# Patient Record
Sex: FEMALE | Race: WHITE | URBAN - METROPOLITAN AREA
[De-identification: names, ages, dates, MRNs, and addresses within clinical notes are randomized per-mention and may not be internally consistent; named-entity substitution may affect disease eponyms.]

---

## 2018-06-03 ENCOUNTER — TRANSFERRED RECORDS (OUTPATIENT)
Dept: HEALTH INFORMATION MANAGEMENT | Facility: CLINIC | Age: 53
End: 2018-06-03

## 2018-06-03 ENCOUNTER — HOSPITAL ENCOUNTER (INPATIENT)
Facility: HOSPITAL | Age: 53
LOS: 2 days | Discharge: HOME | DRG: 885 | End: 2018-06-05
Attending: PSYCHIATRY & NEUROLOGY | Admitting: PSYCHIATRY & NEUROLOGY
Payer: MEDICARE

## 2018-06-03 PROBLEM — F29 PSYCHOSIS (H): Status: ACTIVE | Noted: 2018-06-03

## 2018-06-03 PROCEDURE — 25000132 ZZH RX MED GY IP 250 OP 250 PS 637: Mod: GY | Performed by: NURSE PRACTITIONER

## 2018-06-03 PROCEDURE — A9270 NON-COVERED ITEM OR SERVICE: HCPCS | Mod: GY | Performed by: NURSE PRACTITIONER

## 2018-06-03 PROCEDURE — 12400000 ZZH R&B MH

## 2018-06-03 RX ORDER — POTASSIUM CHLORIDE 1500 MG/1
20 TABLET, EXTENDED RELEASE ORAL DAILY
COMMUNITY

## 2018-06-03 RX ORDER — LORAZEPAM 1 MG/1
1 TABLET ORAL PRN
COMMUNITY

## 2018-06-03 RX ORDER — HYDROXYZINE HYDROCHLORIDE 25 MG/1
25 TABLET, FILM COATED ORAL 3 TIMES DAILY PRN
COMMUNITY

## 2018-06-03 RX ORDER — TOPIRAMATE 50 MG/1
100 TABLET, FILM COATED ORAL 2 TIMES DAILY
COMMUNITY
Start: 2013-07-22

## 2018-06-03 RX ORDER — TRAZODONE HYDROCHLORIDE 50 MG/1
50 TABLET, FILM COATED ORAL
Status: DISCONTINUED | OUTPATIENT
Start: 2018-06-03 | End: 2018-06-05 | Stop reason: HOSPADM

## 2018-06-03 RX ORDER — LOPERAMIDE HCL 2 MG
2 CAPSULE ORAL
Status: DISCONTINUED | OUTPATIENT
Start: 2018-06-03 | End: 2018-06-05 | Stop reason: HOSPADM

## 2018-06-03 RX ORDER — LEVOTHYROXINE SODIUM 175 UG/1
175 TABLET ORAL DAILY
COMMUNITY

## 2018-06-03 RX ORDER — ALUMINA, MAGNESIA, AND SIMETHICONE 2400; 2400; 240 MG/30ML; MG/30ML; MG/30ML
30 SUSPENSION ORAL EVERY 4 HOURS PRN
Status: DISCONTINUED | OUTPATIENT
Start: 2018-06-03 | End: 2018-06-05 | Stop reason: HOSPADM

## 2018-06-03 RX ORDER — OLANZAPINE 10 MG/2ML
10 INJECTION, POWDER, FOR SOLUTION INTRAMUSCULAR 3 TIMES DAILY PRN
Status: DISCONTINUED | OUTPATIENT
Start: 2018-06-03 | End: 2018-06-05 | Stop reason: HOSPADM

## 2018-06-03 RX ORDER — OLANZAPINE 10 MG/1
10 TABLET ORAL 3 TIMES DAILY PRN
Status: DISCONTINUED | OUTPATIENT
Start: 2018-06-03 | End: 2018-06-05 | Stop reason: HOSPADM

## 2018-06-03 RX ORDER — BISACODYL 10 MG
10 SUPPOSITORY, RECTAL RECTAL DAILY PRN
Status: DISCONTINUED | OUTPATIENT
Start: 2018-06-03 | End: 2018-06-05 | Stop reason: HOSPADM

## 2018-06-03 RX ORDER — MULTIVIT-MIN/IRON/FOLIC ACID/K 18-600-40
2000 CAPSULE ORAL DAILY
COMMUNITY

## 2018-06-03 RX ORDER — MIRTAZAPINE 15 MG/1
15 TABLET, FILM COATED ORAL AT BEDTIME
COMMUNITY

## 2018-06-03 RX ORDER — HYDROXYZINE HYDROCHLORIDE 25 MG/1
25-50 TABLET, FILM COATED ORAL EVERY 4 HOURS PRN
Status: DISCONTINUED | OUTPATIENT
Start: 2018-06-03 | End: 2018-06-05 | Stop reason: HOSPADM

## 2018-06-03 RX ORDER — OMEPRAZOLE AND SODIUM BICARBONATE 40; 1100 MG/1; MG/1
1 CAPSULE ORAL DAILY
COMMUNITY
Start: 2012-08-24

## 2018-06-03 RX ORDER — ACETAMINOPHEN 325 MG/1
650 TABLET ORAL EVERY 4 HOURS PRN
Status: DISCONTINUED | OUTPATIENT
Start: 2018-06-03 | End: 2018-06-05 | Stop reason: HOSPADM

## 2018-06-03 RX ORDER — PINDOLOL 10 MG/1
10 TABLET ORAL 2 TIMES DAILY
COMMUNITY
Start: 2009-09-02

## 2018-06-03 RX ADMIN — OLANZAPINE 10 MG: 10 TABLET, FILM COATED ORAL at 20:53

## 2018-06-03 RX ADMIN — LOPERAMIDE HYDROCHLORIDE 2 MG: 2 CAPSULE ORAL at 18:38

## 2018-06-03 ASSESSMENT — ACTIVITIES OF DAILY LIVING (ADL)
SWALLOWING: 0-->SWALLOWS FOODS/LIQUIDS WITHOUT DIFFICULTY
TRANSFERRING: 0-->INDEPENDENT
TOILETING: 0-->INDEPENDENT
AMBULATION: 0-->INDEPENDENT
RETIRED_EATING: 0-->INDEPENDENT
DRESS: 0-->INDEPENDENT
FALL_HISTORY_WITHIN_LAST_SIX_MONTHS: NO
BATHING: 0-->INDEPENDENT
RETIRED_COMMUNICATION: 0-->UNDERSTANDS/COMMUNICATES WITHOUT DIFFICULTY
COGNITION: 0 - NO COGNITION ISSUES REPORTED

## 2018-06-03 NOTE — IP AVS SNAPSHOT
MRN:[id number]                      After Visit Summary   6/3/2018    Patience Lemons    MRN: [id number]           Thank you!     Thank you for choosing [facility] for your care. Our goal is always to provide you with excellent care. Hearing back from our patients is one way we can continue to improve our services. Please take a few minutes to complete the written survey that you may receive in the mail after you visit with us. Thank you!        Patient Information     Date Of Birth          1965        Designated Caregiver       Most Recent Value    Caregiver    Will someone help with your care after discharge? no      About your hospital stay     You were admitted on:  Karrie 3, 2018 You last received care in the:  [facility]    You were discharged on:  June 5, 2018       Who to Call     For medical emergencies, please call 911.  For non-urgent questions about your medical care, please call your primary care provider or clinic, None          Attending Provider     Provider Specialty    Hugo Ohara MD Psychiatry    Rashida Hanson NP Licensed [facility]       Primary Care Provider Fax #    Physician No Ref-Primary [phone]      Further instructions from your care team       Behavioral Discharge Planning and Instructions    Summary: Patient was admitted with SI and plan to OD on medications     Main Diagnosis: schizophrenia-disorganized type, Mild cognitive impairment    Major Treatments, Procedures and Findings: Stabilize with medications, connect with community programs.    Symptoms to Report: feeling more aggressive, increased confusion, losing more sleep, mood getting worse or thoughts of suicide    Lifestyle Adjustment: Take all medications as prescribed, meet with doctor/ medication provider, out patient therapist, , and [facility] worker as scheduled. Abstain from alcohol or any unprescribed drugs.    Psychiatry Follow-up:     [facility] Anuel Hernandez  Manager- Jacqueline- As arranged   [facility]- Arcenio- As arranged   Medication Management- Arianne Talib- June 13th @ 1:30pm   Injection- June 6th @ 1:30pm  [address] [place] [address]  Phone- [phone]   Fax- [phone]    [facility]  Therapy- Elsa Felix- Monday June 11th @ 3pm   [address] [address]    Phone: [phone]  Fax: [phone]    [facility]   - Luisana- As arranged   [address] [place] [address]  Phone [phone]   Fax- [phone]      [facility]  PCP- Jyotsna Pedroza- As needed   [address] [place] [address]   Phone: [phone]      Resources:   Crisis Intervention: 879.829.8113 or 822-987-2575 (TTY: 497.612.1849).  Call anytime for help.  National [place] on Mental Illness (www.[place].harika.org): 301.232.2599 or 758-067-6591.  Suicide Awareness Voices of Education (SAVE) (www.save.org): 872-385-EHQM (3780)  National Suicide Prevention Line (www.mentalhealthmn.org): 751-717-SQKL (3247)  Mental Health Consumer/Survivor Network of [place] (www.mhcsn.net): 114.454.4688 or 961-366-9463  Mental Health Association of [place] (www.mentalhealth.org): 299.238.9486 or 173-491-7276    General Medication Instructions:   See your medication sheet(s) for instructions.   Take all medicines as directed.  Make no changes unless your doctor suggests them.   Go to all your doctor visits.  Be sure to have all your required lab tests. This way, your medicines can be refilled on time.  Do not use any drugs not prescribed by your doctor.  Avoid alcohol.      [place] Area:  [facility], [facility] stabilization [facility]- [phone]  [facility] Crisis Line: 3-521-071-3335  Advocates For Family Peace: 358-3429  Sexual Assault Program [facility]: 617.533.4813 or 1-280-579-4210  [facility] [facility]attered Women[facility]: 7-290-972-0797 Ext: 279       Calls answered Mon-Fri-8:00 am--4:30 pm    [place]:  [facility]ates for Family Peace:  "1-508.677.4773  [facility] first call for help: 1-579.314.9229  [facility]:  (279) 491-2035      [place] Area:  Warm Line: 1-312.452.1225       Calls answered Tuesday--Saturday 4:00 pm--10:00 pm  [facility] Crisis Line - 594.235.2718  [facility] 456-550-7342    [place] Statewide:  [place] Crisis and Referral Services: 1-692.616.9441  National Suicide Prevention Lifeline: 0-420-941-TALK (2524)   - ede9kdxa- Text  Life  to [address]  First Call for Help:   [facility] Helpline- 1-185-YKLM-HELP    Pending Results     No orders found from 2018 to 2018.            Statement of Approval     Ordered          18 0814  I have reviewed and agree with all the recommendations and orders detailed in this document.  EFFECTIVE NOW     Approved and electronically signed by:  Rashida Hanson NP             Admission Information     Date & Time Provider Department Dept. Phone    6/3/2018 Rashida Hanson NP [facility] [phone]      Your Vitals Were     Blood Pressure Pulse Temperature Respirations Height Weight    145/76 70 98  F (36.7  C) (Tympanic) 20 1.676 m (5' 6\") 126.5 kg (278 lb 12.8 oz)    Pulse Oximetry BMI (Body Mass Index)                97% 45 kg/m2          [email or web address] Information     [employer] lets you send messages to your doctor, view your test results, renew your prescriptions, schedule appointments and more. To sign up, go to [email or web address] . Click on \"Log in\" on the left side of the screen, which will take you to the Welcome page. Then click on \"Sign up Now\" on the right side of the page.     You will be asked to enter the access code listed below, as well as some personal information. Please follow the directions to create your username and password.     Your access code is: [email or web address]  Expires: 2018  2:15 PM     Your access code will  in 90 days. If you need help or a new code, please call your [place] clinic or [phone].      "   Care EveryWhere ID     This is your Care EveryWhere ID. This could be used by other organizations to access your [place] medical records  [id number]        Equal Access to Services     MAMI RICHARDSON : Tete Cross, neal garcia, sandy mathurmajae bennett, star simsbrandononeal garvin. So [facility] [phone].    ATENCIÓN: Si habla español, tiene a acevedo disposición servicios gratuitos de asistencia lingüística. Llame al [phone].    We comply with applicable federal civil rights laws and [place] laws. We do not discriminate on the basis of race, color, national origin, age, disability, sex, sexual orientation, or gender identity.               Review of your medicines      CONTINUE these medicines which have NOT CHANGED        Dose / Directions    ARIPiprazole  MG extended release inj syringe   Commonly known as:  ABILIFY MAINTENA        Dose:  400 mg   Inject 400 mg into the muscle every 30 days   Refills:  0       hydrOXYzine 25 MG tablet   Commonly known as:  ATARAX        Dose:  25 mg   Take 25 mg by mouth 3 times daily as needed   Refills:  0       LORazepam 1 MG tablet   Commonly known as:  ATIVAN        Dose:  1 mg   Take 1 mg by mouth as needed   Refills:  0       mirtazapine 15 MG tablet   Commonly known as:  REMERON        Dose:  15 mg   Take 15 mg by mouth At Bedtime   Refills:  0       OSCO LOPERAMIDE HCL OR        Dose:  2 mg   Take 2 mg by mouth 3 times daily (before meals)   Refills:  0       pindolol 10 MG tablet   Commonly known as:  VISKEN        Dose:  10 mg   Take 10 mg by mouth 2 times daily   Refills:  0       Potassium Chloride ER 20 MEQ Tbcr        Dose:  20 mEq   Take 20 mEq by mouth daily   Refills:  0       SYNTHROID 175 MCG tablet   Generic drug:  levothyroxine        Dose:  175 mcg   Take 175 mcg by mouth daily   Refills:  0       topiramate 50 MG tablet   Commonly known as:  TOPAMAX        Dose:  100 mg   Take 100 mg by mouth 2 times daily    Refills:  0       vitamin D 2000 units Caps        Dose:  2000 Units   Take 2,000 Units by mouth daily   Refills:  0       ZEGERID  MG per capsule   Generic drug:  omeprazole-sodium bicarbonate        Dose:  1 capsule   Take 1 capsule by mouth daily   Refills:  0                Protect others around you: Learn how to safely use, store and throw away your medicines at www.disposemymeds.org.             Medication List: This is a list of all your medications and when to take them. Check marks below indicate your daily home schedule. Keep this list as a reference.      Medications           Morning Afternoon Evening Bedtime As Needed    ARIPiprazole  MG extended release inj syringe   Commonly known as:  ABILIFY MAINTENA   Inject 400 mg into the muscle every 30 days                                hydrOXYzine 25 MG tablet   Commonly known as:  ATARAX   Take 25 mg by mouth 3 times daily as needed                                LORazepam 1 MG tablet   Commonly known as:  ATIVAN   Take 1 mg by mouth as needed                                mirtazapine 15 MG tablet   Commonly known as:  REMERON   Take 15 mg by mouth At Bedtime   Last time this was given:  15 mg on 6/4/2018  8:26 PM                                OSCO LOPERAMIDE HCL OR   Take 2 mg by mouth 3 times daily (before meals)                                pindolol 10 MG tablet   Commonly known as:  VISKEN   Take 10 mg by mouth 2 times daily                                Potassium Chloride ER 20 MEQ Tbcr   Take 20 mEq by mouth daily                                SYNTHROID 175 MCG tablet   Take 175 mcg by mouth daily   Last time this was given:  175 mcg on 6/5/2018  6:06 AM   Generic drug:  levothyroxine                                topiramate 50 MG tablet   Commonly known as:  TOPAMAX   Take 100 mg by mouth 2 times daily   Last time this was given:  100 mg on 6/5/2018  8:07 AM                                vitamin D 2000 units Caps   Take 2,000  Units by mouth daily                                ZEGERID  MG per capsule   Take 1 capsule by mouth daily   Generic drug:  omeprazole-sodium bicarbonate

## 2018-06-03 NOTE — IP AVS SNAPSHOT
[facility]    [address] [place] [address]    Phone:  [phone]    Fax:  [phone]                                       After Visit Summary   6/3/2018    Patience Lemons    MRN: [id number]           After Visit Summary Signature Page     I have received my discharge instructions, and my questions have been answered. I have discussed any challenges I see with this plan with the nurse or doctor.    ..........................................................................................................................................  Patient/Patient Representative Signature      ..........................................................................................................................................  Patient Representative Print Name and Relationship to Patient    ..................................................               ................................................  Date                                            Time    ..........................................................................................................................................  Reviewed by Signature/Title    ...................................................              ..............................................  Date                                                            Time

## 2018-06-04 PROCEDURE — 99223 1ST HOSP IP/OBS HIGH 75: CPT | Mod: AI | Performed by: NURSE PRACTITIONER

## 2018-06-04 PROCEDURE — A9270 NON-COVERED ITEM OR SERVICE: HCPCS | Mod: GY | Performed by: NURSE PRACTITIONER

## 2018-06-04 PROCEDURE — 12400000 ZZH R&B MH

## 2018-06-04 PROCEDURE — 25000132 ZZH RX MED GY IP 250 OP 250 PS 637: Mod: GY | Performed by: NURSE PRACTITIONER

## 2018-06-04 RX ORDER — MIRTAZAPINE 15 MG/1
15 TABLET, FILM COATED ORAL AT BEDTIME
Status: DISCONTINUED | OUTPATIENT
Start: 2018-06-04 | End: 2018-06-05 | Stop reason: HOSPADM

## 2018-06-04 RX ORDER — OMEPRAZOLE 40 MG/1
40 CAPSULE, DELAYED RELEASE ORAL DAILY
Status: DISCONTINUED | OUTPATIENT
Start: 2018-06-04 | End: 2018-06-04

## 2018-06-04 RX ORDER — PINDOLOL 5 MG/1
10 TABLET ORAL 2 TIMES DAILY
Status: DISCONTINUED | OUTPATIENT
Start: 2018-06-04 | End: 2018-06-05 | Stop reason: HOSPADM

## 2018-06-04 RX ORDER — POTASSIUM CHLORIDE 1500 MG/1
20 TABLET, EXTENDED RELEASE ORAL DAILY
Status: DISCONTINUED | OUTPATIENT
Start: 2018-06-04 | End: 2018-06-04

## 2018-06-04 RX ORDER — POTASSIUM CHLORIDE 1500 MG/1
20 TABLET, EXTENDED RELEASE ORAL DAILY
Status: DISCONTINUED | OUTPATIENT
Start: 2018-06-04 | End: 2018-06-05 | Stop reason: HOSPADM

## 2018-06-04 RX ORDER — LORAZEPAM 1 MG/1
1 TABLET ORAL DAILY PRN
Status: DISCONTINUED | OUTPATIENT
Start: 2018-06-04 | End: 2018-06-05 | Stop reason: HOSPADM

## 2018-06-04 RX ORDER — TOPIRAMATE 100 MG/1
100 TABLET, FILM COATED ORAL 2 TIMES DAILY
Status: DISCONTINUED | OUTPATIENT
Start: 2018-06-04 | End: 2018-06-05 | Stop reason: HOSPADM

## 2018-06-04 RX ORDER — NYSTATIN 100000 U/G
CREAM TOPICAL 2 TIMES DAILY
Status: DISCONTINUED | OUTPATIENT
Start: 2018-06-04 | End: 2018-06-05 | Stop reason: HOSPADM

## 2018-06-04 RX ADMIN — LOPERAMIDE HYDROCHLORIDE 2 MG: 2 CAPSULE ORAL at 16:16

## 2018-06-04 RX ADMIN — LOPERAMIDE HYDROCHLORIDE 2 MG: 2 CAPSULE ORAL at 11:52

## 2018-06-04 RX ADMIN — POTASSIUM CHLORIDE 20 MEQ: 20 TABLET, EXTENDED RELEASE ORAL at 13:06

## 2018-06-04 RX ADMIN — OLANZAPINE 10 MG: 10 TABLET, FILM COATED ORAL at 19:31

## 2018-06-04 RX ADMIN — TOPIRAMATE 100 MG: 100 TABLET, FILM COATED ORAL at 13:06

## 2018-06-04 RX ADMIN — ACETAMINOPHEN 650 MG: 325 TABLET ORAL at 16:23

## 2018-06-04 RX ADMIN — LEVOTHYROXINE SODIUM 175 MCG: 150 TABLET ORAL at 13:06

## 2018-06-04 RX ADMIN — OMEPRAZOLE 40 MG: 20 CAPSULE, DELAYED RELEASE ORAL at 13:06

## 2018-06-04 RX ADMIN — NYSTATIN: 100000 CREAM TOPICAL at 21:16

## 2018-06-04 RX ADMIN — ACETAMINOPHEN 650 MG: 325 TABLET ORAL at 08:03

## 2018-06-04 RX ADMIN — MIRTAZAPINE 15 MG: 15 TABLET, FILM COATED ORAL at 20:26

## 2018-06-04 RX ADMIN — LOPERAMIDE HYDROCHLORIDE 2 MG: 2 CAPSULE ORAL at 08:03

## 2018-06-04 RX ADMIN — TOPIRAMATE 100 MG: 100 TABLET, FILM COATED ORAL at 20:26

## 2018-06-04 RX ADMIN — VITAMIN D, TAB 1000IU (100/BT) 2000 UNITS: 25 TAB at 13:06

## 2018-06-04 RX ADMIN — NYSTATIN: 100000 CREAM TOPICAL at 13:06

## 2018-06-04 ASSESSMENT — ACTIVITIES OF DAILY LIVING (ADL)
ORAL_HYGIENE: INDEPENDENT
ORAL_HYGIENE: INDEPENDENT
GROOMING: INDEPENDENT
DRESS: SCRUBS (BEHAVIORAL HEALTH)
DRESS: SCRUBS (BEHAVIORAL HEALTH);INDEPENDENT
LAUNDRY: UNABLE TO COMPLETE
GROOMING: INDEPENDENT;SHOWER

## 2018-06-04 NOTE — PLAN OF CARE
"ADMISSION NOTE    Reason for admission: patient reported audio hallucinations distressing to her which lead to SI.  Safety concerns: unknown.  Risk for or history of violence: patient has a history of punching walls, denies ever hitting a person.   Full skin assessment: completed and patient has no pressure ulcers. Patient has areas under breasts and abdominal folds that are reddened and odorous. She denied pain in these areas. No other open areas.     Patient arrived on unit from [facility] accompanied by transport drivers and security on 6/3/2018  4:50 PM.   Status on arrival: pleasant, cooperative, smiling, anxious, disheveled  /80  Pulse 80  Temp 99.2  F (37.3  C) (Tympanic)  Resp 18  Ht 1.676 m (5' 6\")  Wt 126.5 kg (278 lb 12.8 oz)  SpO2 97%  BMI 45 kg/m2  Patient given tour of unit and Welcome to  unit papers given to patient, wanding completed, belongings inventoried, and admission assessment completed.   Patient's legal status on arrival is transport hold. Appropriate legal rights discussed with and copy given to patient. Patient Bill of Rights discussed with and copy given to patient.   Patient denies SI, HI, and thoughts of self harm and contracts for safety while on unit.      Artie Naqvi  6/4/2018  12:36 AM          "

## 2018-06-04 NOTE — PLAN OF CARE
"Problem: Patient Care Overview  Goal: Individualization & Mutuality  Patient will attend >75% of groups.  Patient will perform daily ADLs without prompting.   Patient's skin under abdominal folds and under breasts are reddened. Patient's skin will heal and have no further breakdown while inpatient.   Outcome: No Change  ADMISSION NOTE     Reason for admission: patient reported audio hallucinations distressing to her which lead to SI.  Safety concerns: unknown.  Risk for or history of violence: patient has a history of punching walls, denies ever hitting a person.   Full skin assessment: completed and patient has no pressure ulcers. Patient has areas under breasts and abdominal folds that are reddened and odorous. She denied pain in these areas. No other open areas.      Patient arrived on unit from [facility] accompanied by transport drivers and security on 6/3/2018  4:50 PM.   Status on arrival: pleasant, cooperative, smiling, anxious, disheveled  /80  Pulse 80  Temp 99.2  F (37.3  C) (Tympanic)  Resp 18  Ht 1.676 m (5' 6\")  Wt 126.5 kg (278 lb 12.8 oz)  SpO2 97%  BMI 45 kg/m2  Patient given tour of unit and Welcome to  unit papers given to patient, wanding completed, belongings inventoried, and admission assessment completed.   Patient's legal status on arrival is transport hold. Appropriate legal rights discussed with and copy given to patient. Patient Bill of Rights discussed with and copy given to patient.   Patient denies SI, HI, and thoughts of self harm and contracts for safety while on unit.    Upon admit, patient was smiling and cooperative. She agreed to answer questions and appeared to be authentic and open with answers. She reported she has audio hallucinations she calls \"gremlins\" which tell her things such as they peed on her bed and they took her money. She said the voices cause her stress and then she begins to think of way to kill herself. Patient also reported she had been " "inpatient behavior health a few months ago. She said during the hospital stay she had med changes and then afterwards she had some med changes with Dr. Jewell at [facility] in [place]. She gets her meds at [facility] in [place] which is not open on the weekends and her meds could not be verified. She reported her PCP is Jyotsna Pedroza at [facility] and she sees Alondra Bryant for counseling. Patient's  is Jacqueline at [facility] and she just started [facility] services and met with her once.     Patient reported her depression and anxiety at 8 of 10. She denied SI/HI but said the voices which she calls \"gremlins\" are always there and never go away. She reported they usually are more manageable. Patient said she has trouble falling asleep and staying asleep but doesn't usually have nightmares. She reported that she attempted suicide a few months ago by taking 5 Ativan. After that, her provider told her to not take the Ativan anymore. She took it in the ED this time because she was upset and the staff gave it to her. She said when she found out she was coming here, she told the ED staff she felt \"like punching the wall.\" Patient reported no changes in appetite. Patient sat in the lounge on the open unit for a few hours. She was observed sitting alone and being tearful. Patient asked for something to help her calm. She received 10mg Zyprexa at 20:53.         Problem: Suicide Risk (Adult)  Goal: Physical Safety  Patient will deny suicidal ideation by discharge.   Patient will report relief from voices and that they are manageable by discharge.     Outcome: No Change  No progress made.       "

## 2018-06-04 NOTE — PROGRESS NOTES
06/03/18 1915   Patient Belongings   Did you bring any home meds/supplements to the hospital?  No   Patient Belongings clothing;cell phone/electronics;glasses;earings   Disposition of Belongings Kept with patient;Locker;Sent to security per site process   Belongings Search Yes   Clothing Search Yes   Second Staff Nadya   General Info Comment Pink Underwear, 2 Black Socks, Grey [place] Zipdown, White Capris, Purple Tank Top, Grey Sneakers, Blue Glasses (kept with pt), Silver Colored Earrings (Kept with pt), Silver Colored Ring (kept with pt)    List items sent to safe: Black ZTE Phone  All other belongings put in assigned cubby in belongings room.     I have reviewed my belongings list on admission and verify that it is correct.     Patient signature_______________________________    Second staff witness (if patient unable to sign) ______________________________       I have received all my belongings at discharge.    Patient signature________________________________    Landy   6/3/2018  7:21 PM

## 2018-06-04 NOTE — H&P
"Psychiatric Eval/H&P  Patient Name: aPtience Lemons   YOB: 1965  Age: 53 year old  [id number]    Primary Physician: No Ref-Primary, Physician   Completed By: Rashida Hanson NP     CC: \"I don't feel suicidal anymore\"    HPI   Patience Lemons is a 53 year old never   female who presented via [facility] in [facility] with increased auditory, visual and olfactory hallucinations. Patience reports the \"gremlins\" she hears much of the time are whispers. Patience also does believe these gremlins have tampered with her phone at times and she has played tug-of-war with her purse. She reports she normally does not feel bothered by these but sometimes she just cannot tolerate them. She feels they are worse with medication changes and she has just recently had a medication change. Patience reports she was hospitalized in February but it is not frequent that she is hospitalized. She was committed many years ago to [facility]. She has lived independently for 20 years. She follows with providers at [facility], she has a  and an [employer] worker. Patience is bright and smiling today. She said she usually cries when she feels overwhelmed  With the gremlins and they subside.      SPECIFIC SYMPTOM HISTORY  Sleep:no issues .  Recent appetite change: No.  Recent weight change: No.  Special diet: No.  Other nutritional concerns: none.  Psychotic symptoms (subjective): No hallucinations..nonedenies symptoms of psychosis     [facility]     Past Psychiatric History: Was committed many years ago when [facility]. She follows with providers at [facility]. She denies a history of abuse or trauma. She denies any seizure or head injury.      Social History: Patience was born and raised in the [facility]. She has two older sisters. Her mother is still living. Patience did graduate from highschool . Patience has no legal or  history. She has never been  and has no children. She does have a " boyfriendLoy, of 19 years.   Education, school, occupation, social/peer relations, hobbies/recreational interests,  history, legal history,      Chemical Use History: No substance use or abuse history.     Family Psychiatric History: Unknown family psych history        Medical History and ROS  Prescriptions Prior to Admission   Medication Sig Dispense Refill Last Dose     ARIPiprazole ER (ABILIFY MAINTENA) 400 MG extended release inj syringe Inject 400 mg into the muscle every 30 days   Past Month at Unknown time     Cholecalciferol (VITAMIN D) 2000 units CAPS Take 2,000 Units by mouth daily   6/2/2018 at Unknown time     hydrOXYzine (ATARAX) 25 MG tablet Take 25 mg by mouth 3 times daily as needed   6/2/2018 at Unknown time     levothyroxine (SYNTHROID) 175 MCG tablet Take 175 mcg by mouth daily   6/2/2018 at Unknown time     mirtazapine (REMERON) 15 MG tablet Take 15 mg by mouth At Bedtime   6/2/2018 at Unknown time     omeprazole-sodium bicarbonate (ZEGERID)  MG per capsule Take 1 capsule by mouth daily   6/2/2018 at Unknown time     OSCO LOPERAMIDE HCL OR Take 2 mg by mouth 3 times daily (before meals)   6/3/2018 at Unknown time     pindolol (VISKEN) 10 MG tablet Take 10 mg by mouth 2 times daily   6/2/2018 at Unknown time     Potassium Chloride ER 20 MEQ TBCR Take 20 mEq by mouth daily   6/2/2018 at Unknown time     topiramate (TOPAMAX) 50 MG tablet Take 100 mg by mouth 2 times daily   6/2/2018 at Unknown time     LORazepam (ATIVAN) 1 MG tablet Take 1 mg by mouth as needed   More than a month at Unknown time       Allergies   Allergen Reactions     Carbamazepine Other (See Comments)     Low WBC     Cephalexin Itching     Clozapine Swelling     Hydrocodone Other (See Comments)     Hallucinations.     Kdc:Methylparaben+Polyethylene Glycol+Propylene Glycol+Propylparaben+...      Lithium Other (See Comments)     Toxic.     Loratadine Itching     Citalopram Rash     No past medical history on  "file.  No past surgical history on file.      Physical Exam    Constitutional: oriented to person, place, and time.  appears well-developed and well-nourished.   HENT:   Head: Normocephalic and atraumatic.   Right Ear: External ear normal.   Left Ear: External ear normal.   Nose: Nose normal.   Mouth/Throat: Oropharynx is clear and moist. No oropharyngeal exudate.   Eyes: Conjunctivae and EOM are normal. Pupils are equal, round, and reactive to light. Right eye exhibits no discharge. Left eye exhibits no discharge. No scleral icterus.   Neck: Normal range of motion. Neck supple. No JVD present. No tracheal deviation present. No thyromegaly present.   Cardiovascular: Normal rate, regular rhythm  Pulmonary/Chest: Effort normal and breath sounds verónica   Abdominal: Soft. Bowel sounds are normal..  Skin: Dry, intact, no open areas, rashes, moles of concern    Review of Systems:  Constitution: No weight loss, fever, night sweats  Skin: No rashes, pruritus or open wounds  Neuro: No headaches or seizure activity.  Psych:  See HPI  Eyes: No vision changes.  ENT: No problems chewing or swallowing.   Musculoskeletal: No muscle pain, joint pain or swelling   Respiratory: No cough or dyspnea  Cardiovascular:  No chest pain,  palpitations or fainting  Gastrointestinal:  No abdominal pain, nausea, vomiting or change in bowel habits         MSE/PSYCH  PSYCHIATRIC EXAM  /63  Pulse 70  Temp 98.5  F (36.9  C) (Tympanic)  Resp 15  Ht 1.676 m (5' 6\")  Wt 126.5 kg (278 lb 12.8 oz)  SpO2 98%  BMI 45 kg/m2  -Appearance/Behavior:   No apparent distress  {attitude:pleasant and cooperative  -Motor: normal or unremarkable.  -Gait: Normal.    -Abnormal involuntary movements: none noted.  -Mood: bright.  -Affect: Appropriate/mood-congruent.  -Speech: Normal .                 -Thought process/associations: Logical, Linear and Goal directed.  -Thought content: normal .  -Perceptual disturbances: Occasional hallucinations..            "   -Suicidal/Homicidal Ideation: denies any at present  -Judgment: Fair.  -Insight: Adequate.  *Orientation: time, place and person.  *Memory: intact.  *Attention: Good  *Language: fluent, no aphasias, able to repeat phrases and name objects. Vocab intact.  *Fund of information: appropriate for education  *Cognitive functioning estimate: 1 - slightly impaired.     Labs: no abnormal labs     Assessment/Impression: This is a 53 year old yo female with a history of schizophrenia. She has had an increase of auditory hallucinations recently. She is due for her injection of abilify at this time. She can get this at [facility]note 3[facility] tomorrow. Will continue to monitor behavior throughout the evening. Current denial of suicidal ideations but will continue to monitor through the night. No medication changes will be made at this time as she would prefer her outpatient provider to manage them. She has an [facility] worker and a .   Educated regarding medication indications, risks, benefits, side effects, contraindications and possible interactions. Verbally expressed understanding.     DX: schizophrenia-disorganized type  Mild cognitive impairment     Plan:  Admit to Unit: [address] [place] [address]  Attending: YARELIS Garcia  Patient is: Voluntary  Monitor for target symptoms: decreased psychosis  Provide a safe environment and therapeutic milieu.   Re-Start/Start: Medications per home  Have her follow up ASAP with [facility][note 3] to get her Abilify Maintena injection to help decrease symptoms.    Anticipated length of stay: 2-3 days       YARELIS Garcia

## 2018-06-04 NOTE — PLAN OF CARE
Problem: Patient Care Overview  Goal: Individualization & Mutuality  Patient will attend >75% of groups.  Patient will perform daily ADLs without prompting.   Patient's skin under abdominal folds and under breasts are reddened. Patient's skin will heal and have no further breakdown while inpatient.    Outcome: Improving  Slept all noc without issue - used the washroom once - polite, pleasant and appropriate

## 2018-06-04 NOTE — PLAN OF CARE
Face to face end of shift report received from Charu MUÑOZ RN. Rounding completed. Patient observed.     Bernice Hood  6/4/2018  8:27 AM

## 2018-06-04 NOTE — PLAN OF CARE
Problem: Patient Care Overview  Goal: Team Discussion  Team Plan:   BEHAVIORAL TEAM DISCUSSION    Participants:  Rashida Hanson NP, Doug Jennings NP, Jemima Greco NP, Nuris Paez LSW, Jyotsna Sneed LSW, Phylicia Florez SW student, Claudia Craig RN, Alondra Stratton Recreation Therapy, [facility] OT, Rosie Rivera OT  Progress: minimal, auditory hallucinations  Continued Stay Criteria/Rationale: continue to stabilize on medications, AH  Medical/Physical: spastic colon, obesity  Precautions:   Behavioral Orders   Procedures     Code 1 - Restrict to Unit     Routine Programming     As clinically indicated     Self Injury Precaution     Status 15     Every 15 minutes.     Plan: possibly filing for commitment, continue to stabilize  Rationale for change in precautions or plan: None
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
## 2018-06-04 NOTE — PLAN OF CARE
"Problem: Patient Care Overview  Goal: Individualization & Mutuality  Patient will attend >75% of groups.  Patient will perform daily ADLs without prompting.   Patient's skin under abdominal folds and under breasts are reddened. Patient's skin will heal and have no further breakdown while inpatient.    Outcome: Therapy, progress toward functional goals is gradual  Pt. Attended one group therapy session so far this shift, encouraged to attend more sessions, performs ADL's independently, taking prescribed medications, slept a lot so far this shift, will continue to monitor.    Problem: Suicide Risk (Adult)  Goal: Physical Safety  Patient will deny suicidal ideation by discharge.   Patient will report relief from voices and that they are manageable by discharge.      Outcome: Therapy, progress toward functional goals is gradual  Pt. Denies suicidal ideation, continues to have auditory hallucinations, states \"they are not too bothersome\", cooperative with nursing assessments, will continue to monitor.      "

## 2018-06-04 NOTE — PLAN OF CARE
Face to face end of shift report received from SALUD Alva. Rounding completed. Patient observed. Lying on right side - eyes closed - non-labored breathing noted.     Charu Marr  6/4/2018  2:00 AM

## 2018-06-04 NOTE — PLAN OF CARE
Face to face end of shift report received from Bernice BRANNON. Rounding completed. Patient observed.     Rocio Guerra  6/4/2018  4:01 PM

## 2018-06-05 VITALS
WEIGHT: 278.8 LBS | DIASTOLIC BLOOD PRESSURE: 76 MMHG | OXYGEN SATURATION: 97 % | HEART RATE: 70 BPM | TEMPERATURE: 98 F | RESPIRATION RATE: 20 BRPM | BODY MASS INDEX: 44.81 KG/M2 | SYSTOLIC BLOOD PRESSURE: 145 MMHG | HEIGHT: 66 IN

## 2018-06-05 PROCEDURE — A9270 NON-COVERED ITEM OR SERVICE: HCPCS | Mod: GY | Performed by: NURSE PRACTITIONER

## 2018-06-05 PROCEDURE — 99239 HOSP IP/OBS DSCHRG MGMT >30: CPT | Performed by: NURSE PRACTITIONER

## 2018-06-05 PROCEDURE — 25000132 ZZH RX MED GY IP 250 OP 250 PS 637: Mod: GY | Performed by: NURSE PRACTITIONER

## 2018-06-05 RX ADMIN — OLANZAPINE 10 MG: 10 TABLET, FILM COATED ORAL at 04:04

## 2018-06-05 RX ADMIN — POTASSIUM CHLORIDE 20 MEQ: 20 TABLET, EXTENDED RELEASE ORAL at 08:07

## 2018-06-05 RX ADMIN — OMEPRAZOLE 40 MG: 20 CAPSULE, DELAYED RELEASE ORAL at 08:07

## 2018-06-05 RX ADMIN — LEVOTHYROXINE SODIUM 175 MCG: 150 TABLET ORAL at 06:06

## 2018-06-05 RX ADMIN — VITAMIN D, TAB 1000IU (100/BT) 2000 UNITS: 25 TAB at 08:07

## 2018-06-05 RX ADMIN — TOPIRAMATE 100 MG: 100 TABLET, FILM COATED ORAL at 08:07

## 2018-06-05 RX ADMIN — LOPERAMIDE HYDROCHLORIDE 2 MG: 2 CAPSULE ORAL at 13:10

## 2018-06-05 RX ADMIN — NYSTATIN: 100000 CREAM TOPICAL at 08:08

## 2018-06-05 RX ADMIN — LOPERAMIDE HYDROCHLORIDE 2 MG: 2 CAPSULE ORAL at 08:07

## 2018-06-05 ASSESSMENT — ACTIVITIES OF DAILY LIVING (ADL)
LAUNDRY: UNABLE TO COMPLETE
ORAL_HYGIENE: INDEPENDENT
DRESS: SCRUBS (BEHAVIORAL HEALTH);INDEPENDENT
GROOMING: INDEPENDENT

## 2018-06-05 NOTE — PLAN OF CARE
Discharge Note    Patient Discharged to home on 6/5/2018 2:23 PM via [employer] accompanied by NA to main doors.     Patient informed of discharge instructions in AVS. patient verbalizes understanding and denies having any questions pertaining to AVS. Patient stable at time of discharge. Patient denies SI, HI, and thoughts of self harm at time of discharge. All personal belongings returned to patient. Discharge prescriptions Pt had no medications changes  Luis A Del Valle  6/5/2018  [id number]

## 2018-06-05 NOTE — PLAN OF CARE
Face to face end of shift report received from Shahnaz BRANNON. Rounding completed. Patient observed in dayroom and introduced to nursing for the shift    Luis A Eligio  6/5/2018  0800

## 2018-06-05 NOTE — DISCHARGE SUMMARY
"Psychiatric Discharge Summary    Patience Lemons MRN# [id number]   Age: 53 year old YOB: 1965     Date of Admission:  6/3/2018  Date of Discharge:  6/5/2018  Admitting Physician:  Hugo Ohara MD  Discharge Physician:  Rashida Hanson NP          Event Leading to Hospitalization and Hospital Stay   Patience Lemons is a 53 year old never   female who presented via [facility] in [facility] with increased auditory, visual and olfactory hallucinations. Patience reports the \"gremlins\" she hears much of the time are whispers. Patience also does believe these gremlins have tampered with her phone at times and she has played tug-of-war with her purse. She reports she normally does not feel bothered by these but sometimes she just cannot tolerate them. She feels they are worse with medication changes and she has just recently had a medication change. Patience reports she was hospitalized in February but it is not frequent that she is hospitalized. She was committed many years ago to [facility]. She has lived independently for 20 years. She follows with providers at [facility], she has a  and an [employer] worker. Patience is bright and smiling today. She said she usually cries when she feels overwhelmed  With the gremlins and they subside.    Linette was continued on her medications here and an appointment was made with [facility] to receive her injection of abilify the day after discharge. She also has medication management and therapy appointments within the week. She reports feeling better and less overwhelmed with auditory hallucinations than when she was admitted. It may be considered to decrease the time between injections from 28 days to 21 days to avoid exacerbation of symptoms. However, this is also a new medication so it may simply require adjustment.            at time of discharge, there is no evidence that patient is in immediate danger of self or others.        DIagnoses: "     schizophrenia-disorganized type  Mild cognitive impairment         Labs:   No results found for this or any previous visit (from the past 48 hour(s)).               Discharge Medications:     Current Discharge Medication List      CONTINUE these medications which have NOT CHANGED    Details   ARIPiprazole ER (ABILIFY MAINTENA) 400 MG extended release inj syringe Inject 400 mg into the muscle every 30 days      Cholecalciferol (VITAMIN D) 2000 units CAPS Take 2,000 Units by mouth daily      hydrOXYzine (ATARAX) 25 MG tablet Take 25 mg by mouth 3 times daily as needed      levothyroxine (SYNTHROID) 175 MCG tablet Take 175 mcg by mouth daily      mirtazapine (REMERON) 15 MG tablet Take 15 mg by mouth At Bedtime      omeprazole-sodium bicarbonate (ZEGERID)  MG per capsule Take 1 capsule by mouth daily      OSCO LOPERAMIDE HCL OR Take 2 mg by mouth 3 times daily (before meals)      pindolol (VISKEN) 10 MG tablet Take 10 mg by mouth 2 times daily      Potassium Chloride ER 20 MEQ TBCR Take 20 mEq by mouth daily      topiramate (TOPAMAX) 50 MG tablet Take 100 mg by mouth 2 times daily      LORazepam (ATIVAN) 1 MG tablet Take 1 mg by mouth as needed               Justification for dual anti-psychotic use: Not applicable       Psychiatric Examination:   Appearance:  awake, alert, adequately groomed and dressed in hospital scrubs  Attitude:  cooperative  Eye Contact:  good  Mood:  good  Affect:  mood congruent  Speech:  clear, coherent  Psychomotor Behavior:  no evidence of tardive dyskinesia, dystonia, or tics and intact station, gait and muscle tone  Thought Process:  linear and goal oriented  Associations:  no loose associations  Thought Content:  no evidence of suicidal ideation or homicidal ideation and auditory hallucinations present  Insight:  fair  Judgment:  fair  Oriented to:  time, person, and place  Attention Span and Concentration:  intact  Recent and Remote Memory:  intact  Fund of Knowledge:  delayed  Muscle Strength and Tone: normal  Gait and Station: Normal  Perception: does not appear preoccupied        Discharge Plan:     Psychiatry Follow-up:      [facility]  - Jacqueline- As arranged   [facility]- Arcenio- As arranged   Medication Management- Arianne Mayers- June 13th @ 1:30pm   Injection- June 6th @ 1:30pm  [address] [place] [address]  Phone- [phone]   Fax- [phone]     [facility]  Therapy- Elsa Elvira- Monday June 11th @ 3pm   [address] [address]    Phone: [phone]  Fax: [phone]     [facility]   - Luisana- As arranged   [address] [place] [address]  Phone [phone]   Fax- [phone]        [facility]  PCP- Jyotsna Pedroza- As needed   [address] [place] [address]   Phone: [phone]    Attestation:  The patient has been seen and evaluated by me,  Rashida Hanson, VAN         Discharge Services Provided:    60 minutes spent on discharge services, including:  Final examination of patient.  Review and discussion of Hospital stay.  Instructions for continued outpatient care/goals.  Preparation of discharge records.  Preparation of medications refills and new prescriptions.  Preparation of Applicable referral forms.

## 2018-06-05 NOTE — PLAN OF CARE
Problem: Patient Care Overview  Goal: Individualization & Mutuality  Patient will attend >75% of groups.  Patient will perform daily ADLs without prompting.   Patient's skin under abdominal folds and under breasts are reddened. Patient's skin will heal and have no further breakdown while inpatient.    Pt appeared to be sleeping at the beginning of this shift, normal respirations and position changes noted. Pt to nurses station at 0400 saying her voices are bad again, Zyprexa 10 mg at that time. Pt says the voices are telling her that they are going to harm her so that she can't go home. Pt denies wanting to harm herself or others.

## 2018-06-05 NOTE — PLAN OF CARE
Problem: Patient Care Overview  Goal: Individualization & Mutuality  Patient will attend >75% of groups.  Patient will perform daily ADLs without prompting.   Patient's skin under abdominal folds and under breasts are reddened. Patient's skin will heal and have no further breakdown while inpatient.    Outcome: Adequate for Discharge Date Met: 06/05/18  Pt was up and active this morning. She told me that she had a rough night last night due to voices but still feels ready to go home today. She denied hearing voices right now. She told me that she has not had any suicidal thoughts or any thoughts to harm herself.  She explained to me that she has a good support network and feels safe to leave.  I reviewed her upcomming mental health appointments with her and showed her phone numbers for crisis support.  Pt left with an arranged taxi back to [place]  Problem: Suicide Risk (Adult)  Goal: Physical Safety  Patient will deny suicidal ideation by discharge.   Patient will report relief from voices and that they are manageable by discharge.      Outcome: Adequate for Discharge Date Met: 06/05/18  Pt denies SI or any thoughts of harming herself

## 2018-06-05 NOTE — DISCHARGE INSTRUCTIONS
Behavioral Discharge Planning and Instructions    Summary: Patient was admitted with SI and plan to OD on medications     Main Diagnosis: schizophrenia-disorganized type, Mild cognitive impairment    Major Treatments, Procedures and Findings: Stabilize with medications, connect with community programs.    Symptoms to Report: feeling more aggressive, increased confusion, losing more sleep, mood getting worse or thoughts of suicide    Lifestyle Adjustment: Take all medications as prescribed, meet with doctor/ medication provider, out patient therapist, , and [facility] worker as scheduled. Abstain from alcohol or any unprescribed drugs.    Psychiatry Follow-up:     [facility]  - Jacqueline- As arranged   [facility]- Arcenio- As arranged   Medication Management- Arianne Palm- June 13th @ 1:30pm   Injection- June 6th @ 1:30pm  [address] [place] [address]  Phone- [phone]   Fax- [phone]        [facility]  Therapy- Elsa Felix- Monday June 11th @ 3pm   [address] [place] [address]    Phone: [phone]  Fax: [phone]    [facility]   - Luisana- As arranged   [address] [place] [address]  Phone [phone]   Fax- [phone]      [facility]  PCP- Jyotsna Pedroza- As needed   [address] [place] [address]   Phone: [phone]      Resources:   Crisis Intervention: 159.292.4055 or 097-782-3752 (TTY: 579.179.4796).  Call anytime for help.  [place] on Mental Illness (www.[place].harika.org): 256.584.4170 or 026-987-1373.  Suicide Awareness Voices of Education (SAVE) (www.save.org): 840-992-TZTF (3137)  National Suicide Prevention Line (www.mentalhealthmn.org): 223-807-QPQN (9896)  Mental Health Consumer/Survivor Network of [place] (www.mhcsn.net): 659.700.6367 or 303-374-6637  Mental Health Association [place] (www.mentalhealth.org): 933.837.2670 or 210-898-0247    General Medication Instructions:    See your medication sheet(s) for instructions.   Take all medicines as directed.  Make no changes unless your doctor suggests them.   Go to all your doctor visits.  Be sure to have all your required lab tests. This way, your medicines can be refilled on time.  Do not use any drugs not prescribed by your doctor.  Avoid alcohol.      [place] Area:  [facility], Crisis stabilization [facility]- [phone]  [facility] Crisis Line: 1-930.951.9287  [facility]: 149-9685  Sexual Assault Program [facility]: 517.160.2371 or 1-744.702.5119  [place] [facility]: 1-564.358.5536 Ext: 279       Calls answered Mon-Fri-8:00 am--4:30 pm    [place]:  [facility]: 1-908.355.8929  [facility] first call for help: 5-092-344-7644  [facility]:  (844) 467-7314      [place] Area:  Warm Line: 1-959.665.3945       Calls answered Tuesday--Saturday 4:00 pm--10:00 pm  [facility] Crisis Line - 700.226.9882  [facility] 157-875-8224    [place] Statewide:  [place] Crisis and Referral Services: 1-282.511.9822  National Suicide Prevention Lifeline: 7-215-920-TALK (3415)   - wvl7fhra- Text  Life  to 17980  First Call for Help: 2-1-1  [facility] Helpline- 4-176-BABN-HELP

## 2018-06-05 NOTE — PLAN OF CARE
Problem: Patient Care Overview  Goal: Discharge Needs Assessment  Outcome: Adequate for Discharge Date Met: 06/05/18  Pt is discharging at the recommendation of the treatment team. Pt is discharging to home transported by [employer]. Pt denies having any thoughts of hurting themself or anyone else. Pt denies anxiety or depression. Pt has follow up with [facility], [facility], and [facility]. Discharge instructions, including; demographic sheet, psychiatric evaluation, discharge summary, and AVS were faxed to these next level of care providers.

## 2018-06-05 NOTE — PLAN OF CARE
Face to face end of shift report received from Rocio BRANNON. Rounding completed. Patient observed.     Shahnaz Patel  6/5/2018  12:05 AM

## 2018-06-05 NOTE — PLAN OF CARE
Problem: Patient Care Overview  Goal: Individualization & Mutuality  Patient will attend >75% of groups.  Patient will perform daily ADLs without prompting.   Patient's skin under abdominal folds and under breasts are reddened. Patient's skin will heal and have no further breakdown while inpatient.    Patient denies SI, HI, anxiety 3/10, depression 3/10, auditory hallucinations reported, however patient states she is able to manage the voices, she is anxious d/t the fact that she is going home tomorrow, she states the anxiety she feels is d/t happiness. Patient reports lower back pain 5/10, Tylenol is given at this time with relief. Patient is pleasant and cooperative with nurse assess. Patient does shower, and is out in the lounge, and group, she is interacting with other patients appropriately. Patient states that she feels as though she is ready to go home tomorrow, she feels as though she will be safe, and if she has voices she feels as though she is able to manage them, as she feels in control. Patient does at 1934 report anxiety and Zyprexa is given with relief. Patient takes her HS medications and goes to sleep without issue    Problem: Suicide Risk (Adult)  Goal: Physical Safety  Patient will deny suicidal ideation by discharge.   Patient will report relief from voices and that they are manageable by discharge.      Patient denies SI this shift, voices are reported however she states they are manageable